# Patient Record
Sex: FEMALE | Race: WHITE | NOT HISPANIC OR LATINO | URBAN - METROPOLITAN AREA
[De-identification: names, ages, dates, MRNs, and addresses within clinical notes are randomized per-mention and may not be internally consistent; named-entity substitution may affect disease eponyms.]

---

## 2024-06-20 ENCOUNTER — TELEPHONE (OUTPATIENT)
Dept: FAMILY MEDICINE | Facility: CLINIC | Age: 54
End: 2024-06-20
Payer: COMMERCIAL

## 2024-06-20 NOTE — TELEPHONE ENCOUNTER
is faxing final cultures. Pls nirmal.        Moses Martinez MD   Sent: Thu June 20, 2024  1:21 PM   To: Emma Pena              Message    OK please schedule with me or Bryce for osteo.  Need final culture results too. Thanks.

## 2024-06-27 ENCOUNTER — OFFICE VISIT (OUTPATIENT)
Dept: INFECTIOUS DISEASES | Facility: CLINIC | Age: 54
End: 2024-06-27
Payer: COMMERCIAL

## 2024-06-27 VITALS
DIASTOLIC BLOOD PRESSURE: 70 MMHG | BODY MASS INDEX: 33.59 KG/M2 | SYSTOLIC BLOOD PRESSURE: 117 MMHG | HEIGHT: 67 IN | WEIGHT: 214 LBS | HEART RATE: 90 BPM | OXYGEN SATURATION: 95 %

## 2024-06-27 DIAGNOSIS — M86.671 CHRONIC OSTEOMYELITIS OF RIGHT FOOT: Primary | ICD-10-CM

## 2024-06-27 RX ORDER — PREGABALIN 75 MG/1
75 CAPSULE ORAL 2 TIMES DAILY
COMMUNITY
Start: 2024-04-18

## 2024-06-27 RX ORDER — LIOTHYRONINE SODIUM 25 UG/1
25 TABLET ORAL
COMMUNITY
Start: 2024-05-01

## 2024-06-27 RX ORDER — ROSUVASTATIN CALCIUM 40 MG/1
40 TABLET, COATED ORAL NIGHTLY
COMMUNITY
Start: 2024-05-01

## 2024-06-27 RX ORDER — SOLIFENACIN SUCCINATE 10 MG/1
10 TABLET, FILM COATED ORAL
COMMUNITY
Start: 2024-05-01

## 2024-06-27 RX ORDER — MUPIROCIN 20 MG/G
OINTMENT TOPICAL
COMMUNITY
Start: 2024-04-23

## 2024-06-27 RX ORDER — LEVOTHYROXINE SODIUM 137 UG/1
137 TABLET ORAL EVERY MORNING
COMMUNITY
Start: 2024-05-01

## 2024-06-27 RX ORDER — LINEZOLID 600 MG/1
600 TABLET, FILM COATED ORAL EVERY 12 HOURS
Qty: 60 TABLET | Refills: 1 | Status: SHIPPED | OUTPATIENT
Start: 2024-06-27 | End: 2024-08-08

## 2024-06-27 RX ORDER — EZETIMIBE 10 MG/1
10 TABLET ORAL
COMMUNITY
Start: 2024-05-01

## 2024-06-27 RX ORDER — ESTRADIOL 1 MG/1
TABLET ORAL
COMMUNITY
Start: 2024-05-01

## 2024-06-27 NOTE — PATIENT INSTRUCTIONS
Linezolid (sujatha MCKEON zomilan lid)   Brand Names: US Zyvox   Brand Names: Ruthy APO-Linezolid; SANDOZ Linezolid; Zyvoxam   What is this drug used for?   It is used to treat bacterial infections.     What do I need to tell my doctor BEFORE I take this drug?   If you are allergic to this drug; any part of this drug; or any other drugs, foods, or substances. Tell your doctor about the allergy and what signs you had.  If you have any of these health problems: An adrenal gland tumor called pheochromocytoma, high blood pressure, or an overactive thyroid.  If you have taken certain drugs for depression or Parkinson's disease in the last 14 days. This includes isocarboxazid, phenelzine, tranylcypromine, selegiline, or rasagiline. Very high blood pressure may happen.  This is not a list of all drugs or health problems that interact with this drug.  Tell your doctor and pharmacist about all of your drugs (prescription or OTC, natural products, vitamins) and health problems. You must check to make sure that it is safe for you to take this drug with all of your drugs and health problems. Do not start, stop, or change the dose of any drug without checking with your doctor.  What are some things I need to know or do while I take this drug?   Tell all of your health care providers that you take this drug. This includes your doctors, nurses, pharmacists, and dentists.  Low blood cell counts have happened with this drug. If blood cell counts get very low, this can lead to bleeding problems, infections, or anemia. Call your doctor right away if you have signs of infection like fever, chills, or sore throat; any unexplained bruising or bleeding; or if you feel very tired or weak.  Do not use longer than you have been told. A second infection may happen.  If you have phenylketonuria (PKU), talk with your doctor. Some products have phenylalanine.  High blood pressure has happened with this drug. Have your blood pressure checked as you have been  told by your doctor.  Talk with your doctor before using OTC products that may raise blood pressure. These include cough or cold drugs, diet pills, stimulants, non-steroidal anti-inflammatory drugs (NSAIDs) like ibuprofen or naproxen, and some natural products or aids.  Some foods and drinks, like cheese and red wine, may cause sudden, severe high blood pressure when you are taking this drug. This effect can be deadly. Talk with your doctor about your risk for this effect. Get a list of foods and drinks to avoid. Avoid these foods and drinks for as long as your doctor has told you after this drug is stopped.  Have blood work checked as you have been told by the doctor. Talk with the doctor.  Very bad eye problems have happened with this drug, mainly in people taking this drug for longer than 28 days. Some people who took this drug for longer than 28 days had loss of eyesight. Have your eyesight checked as you were told by your doctor. Talk with your doctor.  If you have high blood sugar (diabetes), talk with your doctor. This drug may lower blood sugar. High blood sugar drugs may need to be changed.  Check your blood sugar as you have been told by your doctor.  This drug may affect being able to father a child. This goes back to normal after this drug is stopped. If you have questions, talk with the doctor.  Tell your doctor if you are pregnant, plan on getting pregnant, or are breast-feeding. You will need to talk about the benefits and risks to you and the baby.     What are some side effects that I need to call my doctor about right away?   WARNING/CAUTION: Even though it may be rare, some people may have very bad and sometimes deadly side effects when taking a drug. Tell your doctor or get medical help right away if you have any of the following signs or symptoms that may be related to a very bad side effect:  Signs of an allergic reaction, like rash; hives; itching; red, swollen, blistered, or peeling skin with  or without fever; wheezing; tightness in the chest or throat; trouble breathing, swallowing, or talking; unusual hoarseness; or swelling of the mouth, face, lips, tongue, or throat.  Signs of too much lactic acid in the blood (lactic acidosis) like fast breathing, fast heartbeat, a heartbeat that does not feel normal, very bad upset stomach or throwing up, feeling very sleepy, shortness of breath, feeling very tired or weak, very bad dizziness, feeling cold, or muscle pain or cramps.  Signs of high blood pressure like very bad headache or dizziness, passing out, or change in eyesight.  Signs of low blood sugar like dizziness, headache, feeling sleepy, feeling weak, shaking, a fast heartbeat, confusion, hunger, or sweating.  A burning, numbness, or tingling feeling that is not normal.  Change in eyesight.  Seizures.  Diarrhea is common with antibiotics. Rarely, a severe form called C diff-associated diarrhea (CDAD) may happen. Sometimes, this has led to a deadly bowel problem. CDAD may happen during or a few months after taking antibiotics. Call your doctor right away if you have stomach pain, cramps, or very loose, watery, or bloody stools. Check with your doctor before treating diarrhea.  A severe and sometimes deadly problem called serotonin syndrome may happen if you take this drug with certain other drugs. Call your doctor right away if you have agitation; change in balance; confusion; hallucinations; fever; fast or abnormal heartbeat; flushing; muscle twitching or stiffness; seizures; shivering or shaking; sweating a lot; severe diarrhea, upset stomach, or throwing up; or severe headache.  What are some other side effects of this drug?   All drugs may cause side effects. However, many people have no side effects or only have minor side effects. Call your doctor or get medical help if any of these side effects or any other side effects bother you or do not go away:  Headache.  Diarrhea, upset stomach, or  throwing up.  These are not all of the side effects that may occur. If you have questions about side effects, call your doctor. Call your doctor for medical advice about side effects.  You may report side effects to your national health agency.  You may report side effects to the FDA at 1-624.178.2801. You may also report side effects at https://www.fda.gov/medwatch.  How is this drug best taken?   Use this drug as ordered by your doctor. Read all information given to you. Follow all instructions closely.  All oral products:   Take with or without food.  Keep taking this drug as you have been told by your doctor or other health care provider, even if you feel well.  Liquid (suspension):   Gently mix by turning over 3 to 5 times before use. Do not shake.  Measure liquid doses carefully. Use the measuring device that comes with this drug. If there is none, ask the pharmacist for a device to measure this drug.  Injection:   It is given as an infusion into a vein over a period of time.  What do I do if I miss a dose?   All oral products:   Take a missed dose as soon as you think about it.  If it is close to the time for your next dose, skip the missed dose and go back to your normal time.  Do not take 2 doses at the same time or extra doses.  Injection:   Call your doctor to find out what to do.  How do I store and/or throw out this drug?   All oral products:   Store at room temperature protected from light. Store in a dry place. Do not store in a bathroom.  Keep lid tightly closed.  Liquid (suspension):   Throw away any part not used 3 weeks after this drug was mixed.  Injection:   If you need to store this drug at home, talk with your doctor, nurse, or pharmacist about how to store it.  All products:   Keep all drugs in a safe place. Keep all drugs out of the reach of children and pets.  Throw away unused or  drugs. Do not flush down a toilet or pour down a drain unless you are told to do so. Check with your  pharmacist if you have questions about the best way to throw out drugs. There may be drug take-back programs in your area.  General drug facts   If your symptoms or health problems do not get better or if they become worse, call your doctor.  Do not share your drugs with others and do not take anyone else's drugs.  Some drugs may have another patient information leaflet. If you have any questions about this drug, please talk with your doctor, nurse, pharmacist, or other health care provider.  Some drugs may have another patient information leaflet. Check with your pharmacist. If you have any questions about this drug, please talk with your doctor, nurse, pharmacist, or other health care provider.  If you think there has been an overdose, call your poison control center or get medical care right away. Be ready to tell or show what was taken, how much, and when it happened.     Consumer Information Use and Disclaimer   This generalized information is a limited summary of diagnosis, treatment, and/or medication information. It is not meant to be comprehensive and should be used as a tool to help the user understand and/or assess potential diagnostic and treatment options. It does NOT include all information about conditions, treatments, medications, side effects, or risks that may apply to a specific patient. It is not intended to be medical advice or a substitute for the medical advice, diagnosis, or treatment of a health care provider based on the health care provider's examination and assessment of a patient's specific and unique circumstances. Patients must speak with a health care provider for complete information about their health, medical questions, and treatment options, including any risks or benefits regarding use of medications. This information does not endorse any treatments or medications as safe, effective, or approved for treating a specific patient. [employer] and its affiliates disclaim any  warranty or liability relating to this information or the use thereof. The use of this information is governed by the Terms of Use, available at https://www.[employer].com/en/solutions/lexicomp/about/samira.  Last Reviewed Date   2020-09-18  Copyright   © 2021 [employer] and its affiliates and/or licensors. All rights reserved.

## 2024-06-27 NOTE — ASSESSMENT & PLAN NOTE
Status post right hallux bone biopsy - chronic osteomyelitis      Culture showed Enterococcus faecalis sensitive to daptomycin, Zyvox, penicillin, vancomycin.       Has failed 7 days of Augmentin, Bactrim, and most recently Levaquin.       PLAN    Case discussed w/ Dr Yandel Martinez.       Zyvox 600 mg bid X 6 weeks. See AVS for education.   Baseline labs (ESR, CRP, CMP, CBC)  Follow up in 2 weeks. Will need repeat labs prior to apt.   Recommend daily probiotics.

## 2024-06-27 NOTE — PROGRESS NOTES
Infectious Diseases Clinic Note    Subjective:       Patient ID: Sylvia Eden is a 54 y.o. female     Chief Complaint: Referral        Sylvia Eden is a 54 y.o. female here today for consultation regarding chronic osteomyelitis R foot.  This is a new diagnosis to me.  Referral from   .   Primary care provider is Korina, Primary Doctor .      Patient recently diagnosed with chronic osteomyelitis status post right helix total nail avulsion with bone biopsy performed on 06/11/2024.  She reports no complications postop.  Neurovascular intact.  She has some edema, but otherwise unremarkable surgical site.  She tells me she has been dealing with this issue since February.  She was initially prescribed Augmentin followed by Bactrim times 7 days.  She just finished up 7 days of Levaquin.   She is followed by Podiatry, Dr. Osullivan.  Recent culture showed Enterococcus faecalis sensitive to penicillin, daptomycin, Zyvox, and vancomycin.  Here for antibiotic recommendations. No acute issues reported at this time.  She denies a history of diabetes, peripheral arterial disease, peripheral vascular disease, neuropathy, autoimmune disorder, immunodeficiency.               Past Medical History:   Diagnosis Date    Chronic osteomyelitis     HLD (hyperlipidemia)        Social History     Socioeconomic History    Marital status:    Tobacco Use    Smoking status: Never    Smokeless tobacco: Never   Substance and Sexual Activity    Alcohol use: Never    Drug use: Never         Current Outpatient Medications:     estradioL (ESTRACE) 1 MG tablet, GIVE 1 TABLET BY MOUTH EVERY NIGHT AT BEDTIME, Disp: , Rfl:     ezetimibe (ZETIA) 10 mg tablet, Take 10 mg by mouth., Disp: , Rfl:     levothyroxine (SYNTHROID) 137 MCG Tab tablet, Take 137 mcg by mouth every morning., Disp: , Rfl:     liothyronine (CYTOMEL) 25 MCG Tab, Take 25 mcg by mouth., Disp: , Rfl:     mupirocin (BACTROBAN) 2 % ointment, APPLY TO AFFECTED AREA(S) TWICE A DAY FOR 5-7 DAYS,  Disp: , Rfl:     pregabalin (LYRICA) 75 MG capsule, Take 75 mg by mouth 2 (two) times daily., Disp: , Rfl:     rosuvastatin (CRESTOR) 40 MG Tab, Take 40 mg by mouth every evening., Disp: , Rfl:     solifenacin (VESICARE) 10 MG tablet, Take 10 mg by mouth. as directed, Disp: , Rfl:     linezolid (ZYVOX) 600 mg Tab, Take 1 tablet (600 mg total) by mouth every 12 (twelve) hours., Disp: 60 tablet, Rfl: 1    Review of Systems   Constitutional:  Negative for chills and fever.   Respiratory:  Negative for chest tightness and shortness of breath.    Cardiovascular:  Negative for chest pain and leg swelling.   Gastrointestinal:  Negative for abdominal pain, diarrhea and nausea.   Genitourinary:  Negative for difficulty urinating.   Skin:  Negative for rash and wound.   Allergic/Immunologic: Negative for immunocompromised state.   Neurological:  Negative for weakness and numbness.           Objective:      Vitals:    06/27/24 1544   BP: 117/70   Pulse: 90     Physical Exam  Vitals and nursing note reviewed.   Constitutional:       General: She is not in acute distress.     Appearance: Normal appearance. She is not ill-appearing.   Cardiovascular:      Rate and Rhythm: Normal rate.   Pulmonary:      Effort: Pulmonary effort is normal.   Musculoskeletal:         General: Normal range of motion.      Cervical back: Normal range of motion.   Skin:     General: Skin is warm.   Neurological:      Mental Status: She is alert and oriented to person, place, and time. Mental status is at baseline.   Psychiatric:         Attention and Perception: Attention and perception normal.         Mood and Affect: Mood and affect normal.         Speech: Speech normal.         Behavior: Behavior normal. Behavior is cooperative.         Thought Content: Thought content normal.         Cognition and Memory: Cognition and memory normal.         Judgment: Judgment normal.             Assessment/Plan:       1. Chronic osteomyelitis of right foot       Problem List Items Addressed This Visit          ID    Chronic osteomyelitis - Primary    Current Assessment & Plan       Status post right hallux bone biopsy - chronic osteomyelitis      Culture showed Enterococcus faecalis sensitive to daptomycin, Zyvox, penicillin, vancomycin.       Has failed 7 days of Augmentin, Bactrim, and most recently Levaquin.       PLAN    Case discussed w/ Dr Yandel Martinez.       Zyvox 600 mg bid X 6 weeks. See AVS for education.   Baseline labs (ESR, CRP, CMP, CBC)  Follow up in 2 weeks. Will need repeat labs prior to apt.   Recommend daily probiotics.                Relevant Medications    linezolid (ZYVOX) 600 mg Tab      No visits with results within 1 Month(s) from this visit.   Latest known visit with results is:   No results found for any previous visit.      No results found in the last 30 days.      Duration of encounter: minutes  This includes face-to-face time and non face-to-face time preparing to see the patient (eg, review of tests), obtaining and/or reviewing separately obtained history, documenting clinical information in the electronic or other health record, independently interpreting resultsand communicating results to the patient/family/caregiver, or care coordination.      All diagnostic data (labs/imaging) was reviewed with the patient and/or family member in the room.  All questions were answered to their liking. The patient and/or family member voiced understanding of all instructions provided. Expectations regarding follow up and treatment plan were voiced and confirmed prior to departure. The patient was given orders/instructions at the end of the visit for reference. They were instructed to notify my office if they have not been contacted for imaging/referrals/labs/results in 1-2 weeks. They voiced understanding of all of the above.     DISCLAIMER: This note was prepared with [employer] voice recognition transcription software. Garbled syntax, mangled pronouns, and  other bizarre constructions may be attributed to that software system.     Follow up:     Patient Instructions   Linezolid (li NE zoh lid)   Brand Names: US Zyvox   Brand Names: Ruthy APO-Linezolid; SANDOZ Linezolid; Zyvoxam   What is this drug used for?   It is used to treat bacterial infections.     What do I need to tell my doctor BEFORE I take this drug?   If you are allergic to this drug; any part of this drug; or any other drugs, foods, or substances. Tell your doctor about the allergy and what signs you had.  If you have any of these health problems: An adrenal gland tumor called pheochromocytoma, high blood pressure, or an overactive thyroid.  If you have taken certain drugs for depression or Parkinson's disease in the last 14 days. This includes isocarboxazid, phenelzine, tranylcypromine, selegiline, or rasagiline. Very high blood pressure may happen.  This is not a list of all drugs or health problems that interact with this drug.  Tell your doctor and pharmacist about all of your drugs (prescription or OTC, natural products, vitamins) and health problems. You must check to make sure that it is safe for you to take this drug with all of your drugs and health problems. Do not start, stop, or change the dose of any drug without checking with your doctor.  What are some things I need to know or do while I take this drug?   Tell all of your health care providers that you take this drug. This includes your doctors, nurses, pharmacists, and dentists.  Low blood cell counts have happened with this drug. If blood cell counts get very low, this can lead to bleeding problems, infections, or anemia. Call your doctor right away if you have signs of infection like fever, chills, or sore throat; any unexplained bruising or bleeding; or if you feel very tired or weak.  Do not use longer than you have been told. A second infection may happen.  If you have phenylketonuria (PKU), talk with your doctor. Some products have  phenylalanine.  High blood pressure has happened with this drug. Have your blood pressure checked as you have been told by your doctor.  Talk with your doctor before using OTC products that may raise blood pressure. These include cough or cold drugs, diet pills, stimulants, non-steroidal anti-inflammatory drugs (NSAIDs) like ibuprofen or naproxen, and some natural products or aids.  Some foods and drinks, like cheese and red wine, may cause sudden, severe high blood pressure when you are taking this drug. This effect can be deadly. Talk with your doctor about your risk for this effect. Get a list of foods and drinks to avoid. Avoid these foods and drinks for as long as your doctor has told you after this drug is stopped.  Have blood work checked as you have been told by the doctor. Talk with the doctor.  Very bad eye problems have happened with this drug, mainly in people taking this drug for longer than 28 days. Some people who took this drug for longer than 28 days had loss of eyesight. Have your eyesight checked as you were told by your doctor. Talk with your doctor.  If you have high blood sugar (diabetes), talk with your doctor. This drug may lower blood sugar. High blood sugar drugs may need to be changed.  Check your blood sugar as you have been told by your doctor.  This drug may affect being able to father a child. This goes back to normal after this drug is stopped. If you have questions, talk with the doctor.  Tell your doctor if you are pregnant, plan on getting pregnant, or are breast-feeding. You will need to talk about the benefits and risks to you and the baby.     What are some side effects that I need to call my doctor about right away?   WARNING/CAUTION: Even though it may be rare, some people may have very bad and sometimes deadly side effects when taking a drug. Tell your doctor or get medical help right away if you have any of the following signs or symptoms that may be related to a very bad  side effect:  Signs of an allergic reaction, like rash; hives; itching; red, swollen, blistered, or peeling skin with or without fever; wheezing; tightness in the chest or throat; trouble breathing, swallowing, or talking; unusual hoarseness; or swelling of the mouth, face, lips, tongue, or throat.  Signs of too much lactic acid in the blood (lactic acidosis) like fast breathing, fast heartbeat, a heartbeat that does not feel normal, very bad upset stomach or throwing up, feeling very sleepy, shortness of breath, feeling very tired or weak, very bad dizziness, feeling cold, or muscle pain or cramps.  Signs of high blood pressure like very bad headache or dizziness, passing out, or change in eyesight.  Signs of low blood sugar like dizziness, headache, feeling sleepy, feeling weak, shaking, a fast heartbeat, confusion, hunger, or sweating.  A burning, numbness, or tingling feeling that is not normal.  Change in eyesight.  Seizures.  Diarrhea is common with antibiotics. Rarely, a severe form called C diff-associated diarrhea (CDAD) may happen. Sometimes, this has led to a deadly bowel problem. CDAD may happen during or a few months after taking antibiotics. Call your doctor right away if you have stomach pain, cramps, or very loose, watery, or bloody stools. Check with your doctor before treating diarrhea.  A severe and sometimes deadly problem called serotonin syndrome may happen if you take this drug with certain other drugs. Call your doctor right away if you have agitation; change in balance; confusion; hallucinations; fever; fast or abnormal heartbeat; flushing; muscle twitching or stiffness; seizures; shivering or shaking; sweating a lot; severe diarrhea, upset stomach, or throwing up; or severe headache.  What are some other side effects of this drug?   All drugs may cause side effects. However, many people have no side effects or only have minor side effects. Call your doctor or get medical help if any of  these side effects or any other side effects bother you or do not go away:  Headache.  Diarrhea, upset stomach, or throwing up.  These are not all of the side effects that may occur. If you have questions about side effects, call your doctor. Call your doctor for medical advice about side effects.  You may report side effects to your national health agency.  You may report side effects to the FDA at 1-184.654.6764. You may also report side effects at https://www.fda.gov/medwatch.  How is this drug best taken?   Use this drug as ordered by your doctor. Read all information given to you. Follow all instructions closely.  All oral products:   Take with or without food.  Keep taking this drug as you have been told by your doctor or other health care provider, even if you feel well.  Liquid (suspension):   Gently mix by turning over 3 to 5 times before use. Do not shake.  Measure liquid doses carefully. Use the measuring device that comes with this drug. If there is none, ask the pharmacist for a device to measure this drug.  Injection:   It is given as an infusion into a vein over a period of time.  What do I do if I miss a dose?   All oral products:   Take a missed dose as soon as you think about it.  If it is close to the time for your next dose, skip the missed dose and go back to your normal time.  Do not take 2 doses at the same time or extra doses.  Injection:   Call your doctor to find out what to do.  How do I store and/or throw out this drug?   All oral products:   Store at room temperature protected from light. Store in a dry place. Do not store in a bathroom.  Keep lid tightly closed.  Liquid (suspension):   Throw away any part not used 3 weeks after this drug was mixed.  Injection:   If you need to store this drug at home, talk with your doctor, nurse, or pharmacist about how to store it.  All products:   Keep all drugs in a safe place. Keep all drugs out of the reach of children and pets.  Throw away unused  or  drugs. Do not flush down a toilet or pour down a drain unless you are told to do so. Check with your pharmacist if you have questions about the best way to throw out drugs. There may be drug take-back programs in your area.  General drug facts   If your symptoms or health problems do not get better or if they become worse, call your doctor.  Do not share your drugs with others and do not take anyone else's drugs.  Some drugs may have another patient information leaflet. If you have any questions about this drug, please talk with your doctor, nurse, pharmacist, or other health care provider.  Some drugs may have another patient information leaflet. Check with your pharmacist. If you have any questions about this drug, please talk with your doctor, nurse, pharmacist, or other health care provider.  If you think there has been an overdose, call your poison control center or get medical care right away. Be ready to tell or show what was taken, how much, and when it happened.     Consumer Information Use and Disclaimer   This generalized information is a limited summary of diagnosis, treatment, and/or medication information. It is not meant to be comprehensive and should be used as a tool to help the user understand and/or assess potential diagnostic and treatment options. It does NOT include all information about conditions, treatments, medications, side effects, or risks that may apply to a specific patient. It is not intended to be medical advice or a substitute for the medical advice, diagnosis, or treatment of a health care provider based on the health care provider's examination and assessment of a patient's specific and unique circumstances. Patients must speak with a health care provider for complete information about their health, medical questions, and treatment options, including any risks or benefits regarding use of medications. This information does not endorse any treatments or medications as  safe, effective, or approved for treating a specific patient. UpToDate, Inc. and its affiliates disclaim any warranty or liability relating to this information or the use thereof. The use of this information is governed by the Terms of Use, available at https://www.wutabout.[employer]/en/solutions/lexicomp/about/samira.  Last Reviewed Date   2020-09-18  Copyright   © 2021 UpToDate, Inc. and its affiliates and/or licensors. All rights reserved.     Follow up in about 6 months (around 12/27/2024), or if symptoms worsen or fail to improve.

## 2024-07-08 ENCOUNTER — TELEPHONE (OUTPATIENT)
Dept: FAMILY MEDICINE | Facility: CLINIC | Age: 54
End: 2024-07-08
Payer: COMMERCIAL

## 2024-07-08 NOTE — TELEPHONE ENCOUNTER
Spoke to the patient regarding her total bilirubin elevation.  She likely has Gilbert's syndrome given the indirect bilirubin total in 2017.  Advised her to continue the medication.  Advised to monitor for jaundice.  She states she is having some globus sensation, hoarseness, and dry mouth.  I advised her to take famotidine 40 mg daily.  She is going to follow-up with me on Wednesday and person.      PROFILE COMP. METABOLIC    GLUCOSE 94  mg/dL    BUN 15.6 6-20 mg/dL    CREATININE 0.81 0.50-0.90 mg/dL    AST (SGOT) 23 0-32 U/L    ALT (SGPT) 20 0-33 U/L    ALKALINE PHOSPHATASE 47  U/L    CALCIUM 9.2 8.6-10.2 mg/dL    PROTEIN, TOTAL 6.8 6.4-8.3 g/dL    ALBUMIN 4.2 3.5-5.2 g/dL    BILIRUBIN, TOTAL 2.40 H 0.00-1.20 mg/dL    SODIUM 142 136-145 mmol/L    POTASSIUM 4.6 3.5-5.1 mmol/L    CHLORIDE 106  mmol/L    CARBON DIOXIDE 25 22-29 mmol/L    GLOBULIN 2.6 1.5-4.5 g/dL    A/G RATIO 1.6 1.0-2.7    BUN/CREATININE RATIO 19.3 6-20    GFR ESTIMATION 87.29 >60.00 mL/min/1.73m2    ANION GAP 11.0 8.0-17.0 mmol/L  CRP    CRP QUANTITATIVE <3.0 <5.0 mg/L    CRP QUALITATIVE NEGATIVE NEGATIVE mg/L     ESR 10    CBC unremarkable.       6/30/2017  FREE T3 3.9 2.5-4.3 pg/ml  TSH 0.02 L 0.27-4.20 UIU/ML  T4(FREE) 1.43 0.93-1.70 NG/DL  HEPATIC/LIVER PANEL    AST (SGOT) 20 0-32 IU/L    ALT (SGPT) 12 0-33 IU/L    ALKALINE PHOSPHATASE 52  IU/L    PROTEIN, TOTAL 6.9 6.4-8.3 g/dL    ALBUMIN 4.0 3.97-4.94 g/dL    BILIRUBIN, TOTAL 1.30 H 0.00-1.20 mg/dL    BILIRUBIN DIRECT 0.20 0.00-0.30 mg/dL    GLOBULIN 2.9 1.5-4.5 g/dL    INDIRECT BILIRUBIN 1.1 H 0.10-0.80 mg/dL

## 2024-07-09 NOTE — PATIENT INSTRUCTIONS
Gilbert's Syndrome       Patients with Gilbert syndrome typically present with episodes of mild intermittent jaundice due to predominantly unconjugated hyperbilirubinemia. Symptoms usually first appear during adolescence, when alterations in sex steroid concentrations alter bilirubin metabolism, leading to increased plasma bilirubin concentrations [9]. Other than intermittent episodes of mild jaundice, patients are typically asymptomatic, though some will have nonspecific complaints, such as malaise, abdominal discomfort, or fatigue, of uncertain relation to the elevated plasma bilirubin concentration     Routine laboratory tests are usually normal in patients with Gilbert syndrome, except for unconjugated hyperbilirubinemia Serum bilirubin levels fluctuate; they are usually less than 4 mg/dL and can be normal. The conjugated bilirubin is typically less than 20 percent of the total bilirubin fraction.     Exacerbating factors -- Certain associated pathologic conditions or physiologic events can increase the plasma bilirubin concentrations to higher values, but usually less than 6 mg/dL.  Episodes of jaundice in patients with Gilbert syndrome can be triggered by events that lead to increased bilirubin production, such as [3,16]:  ?Fasting  ?Hemolysis  ?Intercurrent febrile illnesses  ?Physical exertion  ?Stress  ?Menses    Reducing total daily caloric intake to 400 kcal results in a two- to threefold increase in the plasma bilirubin concentration within 48 hours [17,18]. A similar rise in bilirubin occurs in patients with Gilbert syndrome who receive a normocaloric diet without lipids [19]. The bilirubin concentration returns to baseline within 12 to 24 hours after resuming a normal diet. The cause of hyperbilirubinemia during fasting is probably multifactorial.     Diagnosis -- Gilbert syndrome should be suspected in individuals with mild unconjugated hyperbilirubinemia possibly provoked by factors such as  "dehydration, fasting, intercurrent disease, menstruation, or overexertion, and no apparent liver disease or hemolysis. A presumptive diagnosis can be made in patients with the following features [24-27]:  ?Unconjugated hyperbilirubinemia (<4 mg/dL) on repeated testing  ?A normal complete blood count, blood smear, and reticulocyte count  ?Normal plasma aminotransferases and alkaline phosphatase concentrations  The diagnosis is definitive in patients who continue to have normal laboratory studies (other than the elevation in plasma bilirubin) during the next 12 to 18 months. Genetic testing can confirm the diagnosis in settings where there is diagnostic confusion    The Basics   Written by the doctors and editors at [facility]   What is Gilbert syndrome? -- Gilbert syndrome is a condition that causes a substance called "bilirubin" to build up in the blood.  Gilbert syndrome is caused by an abnormal gene that runs in families. People who have Gilbert syndrome were born with it.  What are the symptoms of Gilbert syndrome? -- Often, Gilbert syndrome does not cause any symptoms. If it does, the most common symptom is jaundice. Jaundice makes the skin or whites of the eyes look yellow.  Jaundice is common in  babies. Babies with Gilbert syndrome might have jaundice longer or worse than other babies, but doctors can treat it. In people with Gilbert syndrome, jaundice can be triggered by a fever, physical effort such as hard exercise, stress, or not eating.  Is there a test for Gilbert syndrome? -- Yes. Your doctor or nurse can do different blood tests to see if you have Gilbert syndrome. In some cases, they can also order a test to check whether you have the gene that causes Gilbert syndrome. But the gene test is not usually needed.  How is Gilbert syndrome treated? -- Gilbert syndrome does not need treatment. The jaundice goes away on its own, and does not usually cause health problems.  If you have Gilbert syndrome, " tell all your doctors and nurses that you have it. Also tell them about all the medicines you take, including over-the-counter and herbal medicines. Your doctors and nurses need to know about your condition when deciding on medicines for you, because people with Gilbert syndrome can have worse side effects from certain medicines than other people.  All topics are updated as new evidence becomes available and our peer review process is complete.  This topic retrieved from [employer] on: Sep 21, 2021.  Topic 35789 Version 9.0  Release: 29.4.2 - C29.263  © 2021 UpToDate, Inc. and/or its affiliates. All rights reserved.  Consumer Information Use and Disclaimer   This information is not specific medical advice and does not replace information you receive from your health care provider. This is only a brief summary of general information. It does NOT include all information about conditions, illnesses, injuries, tests, procedures, treatments, therapies, discharge instructions or life-style choices that may apply to you. You must talk with your health care provider for complete information about your health and treatment options. This information should not be used to decide whether or not to accept your health care provider's advice, instructions or recommendations. Only your health care provider has the knowledge and training to provide advice that is right for you. The use of this information is governed by the [employer] End User License Agreement, available at https://www.[employer]/en/solutions/[employer]/about/samira.The use of [employer] content is governed by the [employer] Terms of Use. ©2021 UpToDate, Inc. All rights reserved.  Copyright   © 2021 UpToDate, Inc. and/or its affiliates. All rights reserved.

## 2024-07-10 ENCOUNTER — OFFICE VISIT (OUTPATIENT)
Dept: INFECTIOUS DISEASES | Facility: CLINIC | Age: 54
End: 2024-07-10
Payer: COMMERCIAL

## 2024-07-10 VITALS — OXYGEN SATURATION: 98 % | DIASTOLIC BLOOD PRESSURE: 69 MMHG | SYSTOLIC BLOOD PRESSURE: 100 MMHG | HEART RATE: 84 BPM

## 2024-07-10 DIAGNOSIS — E80.6 HYPERBILIRUBINEMIA: ICD-10-CM

## 2024-07-10 DIAGNOSIS — M86.60 CHRONIC OSTEOMYELITIS: Primary | ICD-10-CM

## 2024-07-10 PROCEDURE — 1159F MED LIST DOCD IN RCRD: CPT | Mod: CPTII,S$GLB,, | Performed by: NURSE PRACTITIONER

## 2024-07-10 PROCEDURE — 3078F DIAST BP <80 MM HG: CPT | Mod: CPTII,S$GLB,, | Performed by: NURSE PRACTITIONER

## 2024-07-10 PROCEDURE — 3074F SYST BP LT 130 MM HG: CPT | Mod: CPTII,S$GLB,, | Performed by: NURSE PRACTITIONER

## 2024-07-10 PROCEDURE — 99213 OFFICE O/P EST LOW 20 MIN: CPT | Mod: S$GLB,,, | Performed by: NURSE PRACTITIONER

## 2024-07-10 NOTE — PROGRESS NOTES
Infectious Diseases Clinic Note    Subjective:       Patient ID: Sylvia Eden is a 54 y.o. female     Chief Complaint: Follow-up        Sylvia Eden is a 54 y.o. female here today for consultation regarding chronic osteomyelitis R foot.  This is a new diagnosis to me.  Referral from   .   Primary care provider is Korina, Primary Doctor .      Patient recently diagnosed with chronic osteomyelitis status post right helix total nail avulsion with bone biopsy performed on 06/11/2024.  She reports no complications postop.  Neurovascular intact.  She has some edema, but otherwise unremarkable surgical site.  During her last encounter, we started her on Zyvox.  She developed a sore throat and cough after starting the medication.  She was instructed to start famotidine 20 mg b.i.d. until her follow-up today.  She reports resolution of sore throat and cough.  She is tolerating Zyvox well.  No other side effects reported.  She tells me that she recently saw her podiatrist, Dr. Osullivan.  He told her that her wound was healed and that residual erythema would eventually resolve.  She denies pain, swelling, throbbing, streaking.  She feels well overall.  No acute issues reported at this time.  She denies a history of diabetes, peripheral arterial disease, peripheral vascular disease, neuropathy, autoimmune disorder, immunodeficiency.               Past Medical History:   Diagnosis Date    Chronic osteomyelitis     HLD (hyperlipidemia)        Social History     Socioeconomic History    Marital status:    Tobacco Use    Smoking status: Never    Smokeless tobacco: Never   Substance and Sexual Activity    Alcohol use: Never    Drug use: Never         Current Outpatient Medications:     estradioL (ESTRACE) 1 MG tablet, GIVE 1 TABLET BY MOUTH EVERY NIGHT AT BEDTIME, Disp: , Rfl:     ezetimibe (ZETIA) 10 mg tablet, Take 10 mg by mouth., Disp: , Rfl:     levothyroxine (SYNTHROID) 137 MCG Tab tablet, Take 137 mcg by mouth every morning.,  Disp: , Rfl:     linezolid (ZYVOX) 600 mg Tab, Take 1 tablet (600 mg total) by mouth every 12 (twelve) hours., Disp: 60 tablet, Rfl: 1    liothyronine (CYTOMEL) 25 MCG Tab, Take 25 mcg by mouth., Disp: , Rfl:     mupirocin (BACTROBAN) 2 % ointment, APPLY TO AFFECTED AREA(S) TWICE A DAY FOR 5-7 DAYS, Disp: , Rfl:     pregabalin (LYRICA) 75 MG capsule, Take 75 mg by mouth 2 (two) times daily., Disp: , Rfl:     rosuvastatin (CRESTOR) 40 MG Tab, Take 40 mg by mouth every evening., Disp: , Rfl:     solifenacin (VESICARE) 10 MG tablet, Take 10 mg by mouth. as directed, Disp: , Rfl:     Review of Systems   Constitutional:  Negative for chills and fever.   Respiratory:  Negative for chest tightness and shortness of breath.    Cardiovascular:  Negative for chest pain and leg swelling.   Gastrointestinal:  Negative for abdominal pain, diarrhea and nausea.   Genitourinary:  Negative for difficulty urinating.   Skin:  Negative for rash and wound.   Allergic/Immunologic: Negative for immunocompromised state.   Neurological:  Negative for weakness and numbness.           Objective:      Vitals:    07/10/24 1357   BP: 100/69   Pulse: 84     Physical Exam  Vitals and nursing note reviewed.   Constitutional:       General: She is not in acute distress.     Appearance: Normal appearance. She is not ill-appearing.   Cardiovascular:      Rate and Rhythm: Normal rate.   Pulmonary:      Effort: Pulmonary effort is normal.   Abdominal:      General: Abdomen is flat.   Musculoskeletal:         General: Normal range of motion.      Cervical back: Normal range of motion.   Skin:     General: Skin is warm.   Neurological:      Mental Status: She is alert and oriented to person, place, and time. Mental status is at baseline.   Psychiatric:         Attention and Perception: Attention and perception normal.         Mood and Affect: Mood and affect normal.         Speech: Speech normal.         Behavior: Behavior normal. Behavior is cooperative.          Thought Content: Thought content normal.         Cognition and Memory: Cognition and memory normal.         Judgment: Judgment normal.             Assessment/Plan:       1. Chronic osteomyelitis    2. Hyperbilirubinemia      Problem List Items Addressed This Visit          ID    Chronic osteomyelitis - Primary    Current Assessment & Plan       Status post right hallux bone biopsy - chronic osteomyelitis        Culture showed Enterococcus faecalis sensitive to daptomycin, Zyvox, penicillin, vancomycin.         Has failed 7 days of Augmentin, Bactrim, and most recently Levaquin.         PLAN     Case discussed w/ Dr Yandel Martinez.         Zyvox 600 mg bid X 6 weeks.  Has completed two weeks of therapy.  Continue Famotidine 20 mg if cough/sore throat return (GERD).   ESR, CRP, CMP, CBC discussed. Repeat labs in two weeks.   Follow up in 3 weeks.   Recommend daily probiotics.          Relevant Orders    Bilirubin, Total       GI    Hyperbilirubinemia    Current Assessment & Plan         Elevated indirect bilirubin suggesting Gilbert's disease.mechanism -overproduction of bilirubin, reduced bilirubin uptake, and impaired bilirubin conjugation       Plan     Reassurance and education on Gilbert's disease provided. See AVS.   Will fraction bilirubin (direct/indirect) at next lab draw. IF it persists, we can get an US for reassurance. LFTs have been otherwise unremarkable.          Relevant Orders    Bilirubin, Total      No visits with results within 1 Month(s) from this visit.   Latest known visit with results is:   No results found for any previous visit.      No results found in the last 30 days.      Duration of encounter: 25 minutes  This includes face-to-face time and non face-to-face time preparing to see the patient (eg, review of tests), obtaining and/or reviewing separately obtained history, documenting clinical information in the electronic or other health record, independently interpreting resultsand  communicating results to the patient/family/caregiver, or care coordination.      All diagnostic data (labs/imaging) was reviewed with the patient and/or family member in the room.  All questions were answered to their liking. The patient and/or family member voiced understanding of all instructions provided. Expectations regarding follow up and treatment plan were voiced and confirmed prior to departure. The patient was given orders/instructions at the end of the visit for reference. They were instructed to notify my office if they have not been contacted for imaging/referrals/labs/results in 1-2 weeks. They voiced understanding of all of the above.     DISCLAIMER: This note was prepared with [employer] voice recognition transcription software. Garbled syntax, mangled pronouns, and other bizarre constructions may be attributed to that software system.     Follow up:     Patient Instructions   Gilbert's Syndrome       Patients with Gilbert syndrome typically present with episodes of mild intermittent jaundice due to predominantly unconjugated hyperbilirubinemia. Symptoms usually first appear during adolescence, when alterations in sex steroid concentrations alter bilirubin metabolism, leading to increased plasma bilirubin concentrations [9]. Other than intermittent episodes of mild jaundice, patients are typically asymptomatic, though some will have nonspecific complaints, such as malaise, abdominal discomfort, or fatigue, of uncertain relation to the elevated plasma bilirubin concentration     Routine laboratory tests are usually normal in patients with Gilbert syndrome, except for unconjugated hyperbilirubinemia Serum bilirubin levels fluctuate; they are usually less than 4 mg/dL and can be normal. The conjugated bilirubin is typically less than 20 percent of the total bilirubin fraction.     Exacerbating factors -- Certain associated pathologic conditions or physiologic events can increase the plasma bilirubin  "concentrations to higher values, but usually less than 6 mg/dL.  Episodes of jaundice in patients with Gilbert syndrome can be triggered by events that lead to increased bilirubin production, such as [3,16]:  ?Fasting  ?Hemolysis  ?Intercurrent febrile illnesses  ?Physical exertion  ?Stress  ?Menses    Reducing total daily caloric intake to 400 kcal results in a two- to threefold increase in the plasma bilirubin concentration within 48 hours [17,18]. A similar rise in bilirubin occurs in patients with Gilbert syndrome who receive a normocaloric diet without lipids [19]. The bilirubin concentration returns to baseline within 12 to 24 hours after resuming a normal diet. The cause of hyperbilirubinemia during fasting is probably multifactorial.     Diagnosis -- Gilbert syndrome should be suspected in individuals with mild unconjugated hyperbilirubinemia possibly provoked by factors such as dehydration, fasting, intercurrent disease, menstruation, or overexertion, and no apparent liver disease or hemolysis. A presumptive diagnosis can be made in patients with the following features [24-27]:  ?Unconjugated hyperbilirubinemia (<4 mg/dL) on repeated testing  ?A normal complete blood count, blood smear, and reticulocyte count  ?Normal plasma aminotransferases and alkaline phosphatase concentrations  The diagnosis is definitive in patients who continue to have normal laboratory studies (other than the elevation in plasma bilirubin) during the next 12 to 18 months. Genetic testing can confirm the diagnosis in settings where there is diagnostic confusion    The Basics   Written by the doctors and editors at [facility]   What is Gilbert syndrome? -- Gilbert syndrome is a condition that causes a substance called "bilirubin" to build up in the blood.  Gilbert syndrome is caused by an abnormal gene that runs in families. People who have Gilbert syndrome were born with it.  What are the symptoms of Gilbert syndrome? -- Often, Gilbert " syndrome does not cause any symptoms. If it does, the most common symptom is jaundice. Jaundice makes the skin or whites of the eyes look yellow.  Jaundice is common in  babies. Babies with Gilbert syndrome might have jaundice longer or worse than other babies, but doctors can treat it. In people with Gilbert syndrome, jaundice can be triggered by a fever, physical effort such as hard exercise, stress, or not eating.  Is there a test for Gilbert syndrome? -- Yes. Your doctor or nurse can do different blood tests to see if you have Gilbert syndrome. In some cases, they can also order a test to check whether you have the gene that causes Gilbert syndrome. But the gene test is not usually needed.  How is Gilbert syndrome treated? -- Gilbert syndrome does not need treatment. The jaundice goes away on its own, and does not usually cause health problems.  If you have Gilbert syndrome, tell all your doctors and nurses that you have it. Also tell them about all the medicines you take, including over-the-counter and herbal medicines. Your doctors and nurses need to know about your condition when deciding on medicines for you, because people with Gilbert syndrome can have worse side effects from certain medicines than other people.  All topics are updated as new evidence becomes available and our peer review process is complete.  This topic retrieved from [employer] on: Sep 21, 2021.  Topic 58174 Version 9.0  Release: 29.4.2 - C29.263  2021 UpToDate, Inc. and/or its affiliates. All rights reserved.  Consumer Information Use and Disclaimer   This information is not specific medical advice and does not replace information you receive from your health care provider. This is only a brief summary of general information. It does NOT include all information about conditions, illnesses, injuries, tests, procedures, treatments, therapies, discharge instructions or life-style choices that may apply to you. You must talk with  your health care provider for complete information about your health and treatment options. This information should not be used to decide whether or not to accept your health care provider's advice, instructions or recommendations. Only your health care provider has the knowledge and training to provide advice that is right for you. The use of this information is governed by the [employer] End User License Agreement, available at https://www.[employer]/en/solutions/[employer]roNiche/about/samira.The use of [employer] content is governed by the [employer] Terms of Use. ©2021 UpToDate, Inc. All rights reserved.  Copyright   © 2021 UpToDate, Inc. and/or its affiliates. All rights reserved.     Follow up in about 3 weeks (around 7/31/2024), or if symptoms worsen or fail to improve.

## 2024-07-10 NOTE — ASSESSMENT & PLAN NOTE
Status post right hallux bone biopsy - chronic osteomyelitis        Culture showed Enterococcus faecalis sensitive to daptomycin, Zyvox, penicillin, vancomycin.         Has failed 7 days of Augmentin, Bactrim, and most recently Levaquin.         PLAN     Case discussed w/ Dr Yandel Martinez.         Zyvox 600 mg bid X 6 weeks.  Has completed two weeks of therapy.  Continue Famotidine 20 mg if cough/sore throat return (GERD).   ESR, CRP, CMP, CBC discussed. Repeat labs in two weeks.   Follow up in 3 weeks.   Recommend daily probiotics.

## 2024-07-10 NOTE — ASSESSMENT & PLAN NOTE
Elevated indirect bilirubin suggesting Gilbert's disease.mechanism -overproduction of bilirubin, reduced bilirubin uptake, and impaired bilirubin conjugation       Plan     Reassurance and education on Gilbert's disease provided. See AVS.   Will fraction bilirubin (direct/indirect) at next lab draw. IF it persists, we can get an US for reassurance. LFTs have been otherwise unremarkable.

## 2024-07-23 ENCOUNTER — TELEPHONE (OUTPATIENT)
Dept: FAMILY MEDICINE | Facility: CLINIC | Age: 54
End: 2024-07-23
Payer: COMMERCIAL

## 2024-07-23 DIAGNOSIS — R17 TOTAL BILIRUBIN, ELEVATED: Primary | ICD-10-CM

## 2024-07-30 NOTE — PROGRESS NOTES
Infectious Diseases Clinic Note    Subjective:       Patient ID: Sylvia Eden is a 54 y.o. female     Chief Complaint: Follow-up        Sylvia Eden is a 54 y.o. female here today for consultation regarding chronic osteomyelitis R foot.  This is a new diagnosis to me.  Referral from   .   Primary care provider is Korina, Primary Doctor .      Patient recently diagnosed with chronic osteomyelitis status post right helix total nail avulsion with bone biopsy performed on 06/11/2024.  She reports no complications postop.  Neurovascular intact.  She has some edema, but otherwise unremarkable surgical site.  Currently on her 5th week of Zyvox.  She is tolerating Zyvox well.  No other side effects reported. She missed two days of medication due to it being out of stock. Her wound is healed.   She denies pain, swelling, throbbing, streaking.  She feels well overall.  No acute issues reported at this time.  She denies a history of diabetes, peripheral arterial disease, peripheral vascular disease, neuropathy, autoimmune disorder, immunodeficiency.      Has Abdominal US pending for 8/16. Indirect bilirubin trending downward. LFTs otherwise normal.                Past Medical History:   Diagnosis Date    Chronic osteomyelitis     HLD (hyperlipidemia)        Social History     Socioeconomic History    Marital status:    Tobacco Use    Smoking status: Never    Smokeless tobacco: Never   Substance and Sexual Activity    Alcohol use: Never    Drug use: Never         Current Outpatient Medications:     estradioL (ESTRACE) 1 MG tablet, GIVE 1 TABLET BY MOUTH EVERY NIGHT AT BEDTIME, Disp: , Rfl:     ezetimibe (ZETIA) 10 mg tablet, Take 10 mg by mouth., Disp: , Rfl:     levothyroxine (SYNTHROID) 137 MCG Tab tablet, Take 137 mcg by mouth every morning., Disp: , Rfl:     linezolid (ZYVOX) 600 mg Tab, Take 1 tablet (600 mg total) by mouth every 12 (twelve) hours., Disp: 60 tablet, Rfl: 1    liothyronine (CYTOMEL) 25 MCG Tab, Take 25  mcg by mouth., Disp: , Rfl:     mupirocin (BACTROBAN) 2 % ointment, APPLY TO AFFECTED AREA(S) TWICE A DAY FOR 5-7 DAYS, Disp: , Rfl:     pregabalin (LYRICA) 75 MG capsule, Take 75 mg by mouth 2 (two) times daily., Disp: , Rfl:     rosuvastatin (CRESTOR) 40 MG Tab, Take 40 mg by mouth every evening., Disp: , Rfl:     solifenacin (VESICARE) 10 MG tablet, Take 10 mg by mouth. as directed, Disp: , Rfl:     Review of Systems   Constitutional:  Negative for chills and fever.   Respiratory:  Negative for chest tightness and shortness of breath.    Cardiovascular:  Negative for chest pain and leg swelling.   Gastrointestinal:  Negative for abdominal pain, diarrhea and nausea.   Genitourinary:  Negative for difficulty urinating.   Skin:  Negative for rash and wound.   Allergic/Immunologic: Negative for immunocompromised state.   Neurological:  Negative for weakness and numbness.           Objective:      Vitals:    07/31/24 1618   BP: 107/88     Physical Exam  Vitals and nursing note reviewed.   Constitutional:       General: She is not in acute distress.     Appearance: Normal appearance. She is not ill-appearing.   Cardiovascular:      Rate and Rhythm: Normal rate.   Pulmonary:      Effort: Pulmonary effort is normal.   Abdominal:      General: Abdomen is flat.   Musculoskeletal:         General: Normal range of motion.      Cervical back: Normal range of motion.   Skin:     General: Skin is warm.   Neurological:      Mental Status: She is alert and oriented to person, place, and time. Mental status is at baseline.   Psychiatric:         Attention and Perception: Attention and perception normal.         Mood and Affect: Mood and affect normal.         Speech: Speech normal.         Behavior: Behavior normal. Behavior is cooperative.         Thought Content: Thought content normal.         Cognition and Memory: Cognition and memory normal.         Judgment: Judgment normal.             Assessment/Plan:       1. Chronic  osteomyelitis    2. Hyperbilirubinemia      Problem List Items Addressed This Visit          ID    Chronic osteomyelitis - Primary    Current Assessment & Plan       Status post right hallux bone biopsy - chronic osteomyelitis        Culture showed Enterococcus faecalis sensitive to daptomycin, Zyvox, penicillin, vancomycin.         Has failed 7 days of Augmentin, Bactrim, and most recently Levaquin.         PLAN     Case discussed w/ Dr Yandel Martinez.      ESR 10  CRP <0.3       Zyvox 600 mg bid X 6 weeks.  Has completed 5th week of therapy and will finish off 6th week.  Continue Famotidine 20 mg if cough/sore throat return (GERD).   Follow up as needed. Her labs are well within normal range and her wound is closed.   Recommend daily probiotics until meds are complete.             GI    Hyperbilirubinemia    Current Assessment & Plan             Elevated indirect bilirubin suggesting Gilbert's disease.mechanism -overproduction of bilirubin, reduced bilirubin uptake, and impaired bilirubin conjugation         Plan      Reassurance and education on Gilbert's disease provided. See AVS.   US was ordered for reassurance. Will be completed on 8/16.  will call her w/ results.               No visits with results within 1 Month(s) from this visit.   Latest known visit with results is:   No results found for any previous visit.      No results found in the last 30 days.      Duration of encounter: minutes  This includes face-to-face time and non face-to-face time preparing to see the patient (eg, review of tests), obtaining and/or reviewing separately obtained history, documenting clinical information in the electronic or other health record, independently interpreting resultsand communicating results to the patient/family/caregiver, or care coordination.      All diagnostic data (labs/imaging) was reviewed with the patient and/or family member in the room.  All questions were answered to their liking. The patient and/or family  member voiced understanding of all instructions provided. Expectations regarding follow up and treatment plan were voiced and confirmed prior to departure. The patient was given orders/instructions at the end of the visit for reference. They were instructed to notify my office if they have not been contacted for imaging/referrals/labs/results in 1-2 weeks. They voiced understanding of all of the above.     DISCLAIMER: This note was prepared with [employer] voice recognition transcription software. Garbled syntax, mangled pronouns, and other bizarre constructions may be attributed to that software system.     Follow up:     There are no Patient Instructions on file for this visit.     Follow up if symptoms worsen or fail to improve.

## 2024-07-30 NOTE — ASSESSMENT & PLAN NOTE
Elevated indirect bilirubin suggesting Gilbert's disease.mechanism -overproduction of bilirubin, reduced bilirubin uptake, and impaired bilirubin conjugation         Plan      Reassurance and education on Gilbert's disease provided. See AVS.   US was ordered for reassurance. Will be completed on 8/16.  will call her w/ results.

## 2024-07-30 NOTE — ASSESSMENT & PLAN NOTE
Status post right hallux bone biopsy - chronic osteomyelitis        Culture showed Enterococcus faecalis sensitive to daptomycin, Zyvox, penicillin, vancomycin.         Has failed 7 days of Augmentin, Bactrim, and most recently Levaquin.         PLAN     Case discussed w/ Dr Yandel Martinez.      ESR 10  CRP <0.3       Zyvox 600 mg bid X 6 weeks.  Has completed 5th week of therapy and will finish off 6th week.  Continue Famotidine 20 mg if cough/sore throat return (GERD).   Follow up as needed. Her labs are well within normal range and her wound is closed.   Recommend daily probiotics until meds are complete.

## 2024-07-31 ENCOUNTER — OFFICE VISIT (OUTPATIENT)
Dept: INFECTIOUS DISEASES | Facility: CLINIC | Age: 54
End: 2024-07-31
Payer: COMMERCIAL

## 2024-07-31 VITALS — DIASTOLIC BLOOD PRESSURE: 88 MMHG | SYSTOLIC BLOOD PRESSURE: 107 MMHG

## 2024-07-31 DIAGNOSIS — E80.6 HYPERBILIRUBINEMIA: ICD-10-CM

## 2024-07-31 DIAGNOSIS — M86.60 CHRONIC OSTEOMYELITIS: Primary | ICD-10-CM

## 2024-07-31 PROCEDURE — 1159F MED LIST DOCD IN RCRD: CPT | Mod: CPTII,S$GLB,, | Performed by: NURSE PRACTITIONER

## 2024-07-31 PROCEDURE — 99213 OFFICE O/P EST LOW 20 MIN: CPT | Mod: S$GLB,,, | Performed by: NURSE PRACTITIONER

## 2024-07-31 PROCEDURE — 3074F SYST BP LT 130 MM HG: CPT | Mod: CPTII,S$GLB,, | Performed by: NURSE PRACTITIONER

## 2024-07-31 PROCEDURE — 3079F DIAST BP 80-89 MM HG: CPT | Mod: CPTII,S$GLB,, | Performed by: NURSE PRACTITIONER

## 2024-08-16 PROBLEM — K76.9 LIVER DISEASE: Status: ACTIVE | Noted: 2024-08-16

## 2024-08-16 PROBLEM — K76.0 HEPATIC STEATOSIS: Status: ACTIVE | Noted: 2024-08-16

## 2024-08-16 PROBLEM — K80.20 CALCULUS OF GALLBLADDER WITHOUT CHOLECYSTITIS WITHOUT OBSTRUCTION: Status: ACTIVE | Noted: 2024-08-16

## 2024-08-19 DIAGNOSIS — K80.20 CALCULUS OF GALLBLADDER WITHOUT CHOLECYSTITIS WITHOUT OBSTRUCTION: ICD-10-CM

## 2024-08-19 DIAGNOSIS — R17 TOTAL BILIRUBIN, ELEVATED: ICD-10-CM

## 2024-08-19 DIAGNOSIS — K76.9 LIVER DISEASE: Primary | ICD-10-CM

## 2024-10-15 ENCOUNTER — TELEPHONE (OUTPATIENT)
Dept: FAMILY MEDICINE | Facility: CLINIC | Age: 54
End: 2024-10-15
Payer: COMMERCIAL

## 2024-10-15 NOTE — TELEPHONE ENCOUNTER
Pt states 1mn after she ended Zyvox her hair started falling out. Is there anything she can take or do for issue? Does have thyroid issues.    Also unable to get Dr SADIE Wiley staff on the ph re: ref. I was unable to reach the main line, the consult line and his nurse's ext.    Faxed GI ref to pt per her request @ [phone] . EX

## 2024-10-15 NOTE — TELEPHONE ENCOUNTER
----- Message from Flor sent at 10/15/2024  8:39 AM CDT -----  Patient requesting a call back in regards to referral please call her back at [phone]